# Patient Record
Sex: MALE | Race: WHITE | NOT HISPANIC OR LATINO | Employment: PART TIME | URBAN - METROPOLITAN AREA
[De-identification: names, ages, dates, MRNs, and addresses within clinical notes are randomized per-mention and may not be internally consistent; named-entity substitution may affect disease eponyms.]

---

## 2022-01-08 ENCOUNTER — HOSPITAL ENCOUNTER (EMERGENCY)
Facility: HOSPITAL | Age: 27
Discharge: HOME | End: 2022-01-09
Admitting: EMERGENCY MEDICINE

## 2022-01-08 ENCOUNTER — APPOINTMENT (OUTPATIENT)
Dept: GENERAL RADIOLOGY | Facility: HOSPITAL | Age: 27
End: 2022-01-08

## 2022-01-08 DIAGNOSIS — M54.50 CHRONIC LOW BACK PAIN, UNSPECIFIED BACK PAIN LATERALITY, UNSPECIFIED WHETHER SCIATICA PRESENT: Primary | ICD-10-CM

## 2022-01-08 DIAGNOSIS — G89.29 CHRONIC LOW BACK PAIN, UNSPECIFIED BACK PAIN LATERALITY, UNSPECIFIED WHETHER SCIATICA PRESENT: Primary | ICD-10-CM

## 2022-01-08 DIAGNOSIS — R06.02 SHORTNESS OF BREATH: ICD-10-CM

## 2022-01-08 LAB
ALBUMIN SERPL-MCNC: 4.6 G/DL (ref 3.5–5.2)
ALBUMIN/GLOB SERPL: 1.8 G/DL
ALP SERPL-CCNC: 46 U/L (ref 39–117)
ALT SERPL W P-5'-P-CCNC: 14 U/L (ref 1–41)
ANION GAP SERPL CALCULATED.3IONS-SCNC: 14 MMOL/L (ref 5–15)
AST SERPL-CCNC: 15 U/L (ref 1–40)
BASOPHILS # BLD AUTO: 0 10*3/MM3 (ref 0–0.2)
BASOPHILS NFR BLD AUTO: 0.2 % (ref 0–1.5)
BILIRUB SERPL-MCNC: 0.5 MG/DL (ref 0–1.2)
BUN SERPL-MCNC: 10 MG/DL (ref 6–20)
BUN/CREAT SERPL: 13.3 (ref 7–25)
CALCIUM SPEC-SCNC: 9.5 MG/DL (ref 8.6–10.5)
CHLORIDE SERPL-SCNC: 103 MMOL/L (ref 98–107)
CO2 SERPL-SCNC: 21 MMOL/L (ref 22–29)
CREAT SERPL-MCNC: 0.75 MG/DL (ref 0.76–1.27)
D DIMER PPP FEU-MCNC: 0.28 MG/L (FEU) (ref 0–0.59)
DEPRECATED RDW RBC AUTO: 41.6 FL (ref 37–54)
EOSINOPHIL # BLD AUTO: 0 10*3/MM3 (ref 0–0.4)
EOSINOPHIL NFR BLD AUTO: 0 % (ref 0.3–6.2)
ERYTHROCYTE [DISTWIDTH] IN BLOOD BY AUTOMATED COUNT: 13.5 % (ref 12.3–15.4)
GFR SERPL CREATININE-BSD FRML MDRD: 126 ML/MIN/1.73
GLOBULIN UR ELPH-MCNC: 2.6 GM/DL
GLUCOSE SERPL-MCNC: 149 MG/DL (ref 65–99)
HCT VFR BLD AUTO: 42.2 % (ref 37.5–51)
HGB BLD-MCNC: 14.6 G/DL (ref 13–17.7)
LYMPHOCYTES # BLD AUTO: 0.6 10*3/MM3 (ref 0.7–3.1)
LYMPHOCYTES NFR BLD AUTO: 6.8 % (ref 19.6–45.3)
MCH RBC QN AUTO: 30.6 PG (ref 26.6–33)
MCHC RBC AUTO-ENTMCNC: 34.6 G/DL (ref 31.5–35.7)
MCV RBC AUTO: 88.5 FL (ref 79–97)
MONOCYTES # BLD AUTO: 0.3 10*3/MM3 (ref 0.1–0.9)
MONOCYTES NFR BLD AUTO: 3.9 % (ref 5–12)
NEUTROPHILS NFR BLD AUTO: 7.4 10*3/MM3 (ref 1.7–7)
NEUTROPHILS NFR BLD AUTO: 89.1 % (ref 42.7–76)
NRBC BLD AUTO-RTO: 0 /100 WBC (ref 0–0.2)
NT-PROBNP SERPL-MCNC: 18.7 PG/ML (ref 0–450)
PLATELET # BLD AUTO: 346 10*3/MM3 (ref 140–450)
PMV BLD AUTO: 6.6 FL (ref 6–12)
POTASSIUM SERPL-SCNC: 4.2 MMOL/L (ref 3.5–5.2)
PROT SERPL-MCNC: 7.2 G/DL (ref 6–8.5)
RBC # BLD AUTO: 4.77 10*6/MM3 (ref 4.14–5.8)
SARS-COV-2 RNA PNL SPEC NAA+PROBE: NOT DETECTED
SODIUM SERPL-SCNC: 138 MMOL/L (ref 136–145)
TROPONIN T SERPL-MCNC: <0.01 NG/ML (ref 0–0.03)
WBC NRBC COR # BLD: 8.3 10*3/MM3 (ref 3.4–10.8)

## 2022-01-08 PROCEDURE — 87635 SARS-COV-2 COVID-19 AMP PRB: CPT | Performed by: NURSE PRACTITIONER

## 2022-01-08 PROCEDURE — 99283 EMERGENCY DEPT VISIT LOW MDM: CPT

## 2022-01-08 PROCEDURE — 83880 ASSAY OF NATRIURETIC PEPTIDE: CPT | Performed by: NURSE PRACTITIONER

## 2022-01-08 PROCEDURE — 80053 COMPREHEN METABOLIC PANEL: CPT | Performed by: NURSE PRACTITIONER

## 2022-01-08 PROCEDURE — 85025 COMPLETE CBC W/AUTO DIFF WBC: CPT | Performed by: NURSE PRACTITIONER

## 2022-01-08 PROCEDURE — C9803 HOPD COVID-19 SPEC COLLECT: HCPCS

## 2022-01-08 PROCEDURE — 84484 ASSAY OF TROPONIN QUANT: CPT | Performed by: NURSE PRACTITIONER

## 2022-01-08 PROCEDURE — 85379 FIBRIN DEGRADATION QUANT: CPT | Performed by: NURSE PRACTITIONER

## 2022-01-08 PROCEDURE — 93005 ELECTROCARDIOGRAM TRACING: CPT | Performed by: NURSE PRACTITIONER

## 2022-01-08 PROCEDURE — 71111 X-RAY EXAM RIBS/CHEST4/> VWS: CPT

## 2022-01-08 RX ORDER — SODIUM CHLORIDE 0.9 % (FLUSH) 0.9 %
10 SYRINGE (ML) INJECTION AS NEEDED
Status: DISCONTINUED | OUTPATIENT
Start: 2022-01-08 | End: 2022-01-09 | Stop reason: HOSPADM

## 2022-01-09 VITALS
RESPIRATION RATE: 20 BRPM | DIASTOLIC BLOOD PRESSURE: 79 MMHG | TEMPERATURE: 98.2 F | HEIGHT: 67 IN | WEIGHT: 120.4 LBS | HEART RATE: 89 BPM | SYSTOLIC BLOOD PRESSURE: 118 MMHG | OXYGEN SATURATION: 98 % | BODY MASS INDEX: 18.9 KG/M2

## 2022-01-09 LAB — QT INTERVAL: 309 MS

## 2022-01-09 NOTE — ED PROVIDER NOTES
Subjective    Chief Complaint   Patient presents with   • Illness     Provider, No Known  No LMP for male patient.  Allergies   Allergen Reactions   • Cefaclor Hives and Rash     History Provided By: Patient    Patient is a 26-year-old male presents emergency department with several complaints today.  He presents with low back pain, chest pain shortness of breath.  Patient has a longstanding history of low back pain, he has been seen by neurosurgery in the past, his primary care provider, as well as physical therapy.  He also reports that he has a diagnosis of asthma, that is exacerbated by his anxiety.  He reports he was working this week, and had worsening pain in his back.  He denies any direct trauma or falls.  No bowel or bladder symptoms.  He does report numbness and tingling in his bilateral lower extremities.  He also reports shortness of breath, feeling as though he cannot take a deep breath.  He denies any abnormal leg pain or swelling.  No nausea, vomiting or diarrhea.  No dizziness, lightheadedness, diaphoresis or syncope.  No recent travel.  No fever or chills.  No history of IV drug abuse.    Of note patient was seen at [facility] for similar presentation this morning.  Please see record review.          Review of Systems   Constitutional: Negative for chills, diaphoresis, fatigue and fever.   Eyes: Negative for photophobia and visual disturbance.   Respiratory: Positive for shortness of breath. Negative for cough and chest tightness.    Cardiovascular: Positive for chest pain. Negative for palpitations and leg swelling.   Gastrointestinal: Negative for abdominal pain, constipation, nausea and vomiting.   Genitourinary: Negative for dysuria.   Musculoskeletal: Positive for back pain. Negative for neck pain.   Skin: Negative for rash.   Neurological: Positive for numbness. Negative for dizziness, tremors, seizures, syncope, facial asymmetry, speech difficulty, weakness, light-headedness and  headaches.       No past medical history on file.    Allergies   Allergen Reactions   • Cefaclor Hives and Rash       No past surgical history on file.    No family history on file.    Social History     Socioeconomic History   • Marital status: Single           Objective   Physical Exam  Vitals and nursing note reviewed.   Constitutional:       General: He is not in acute distress.     Appearance: Normal appearance. He is not ill-appearing, toxic-appearing or diaphoretic.   HENT:      Head: Normocephalic.      Nose: Nose normal.      Mouth/Throat:      Mouth: Mucous membranes are moist.      Pharynx: Oropharynx is clear.   Eyes:      Extraocular Movements: Extraocular movements intact.      Conjunctiva/sclera: Conjunctivae normal.      Pupils: Pupils are equal, round, and reactive to light.   Cardiovascular:      Rate and Rhythm: Normal rate and regular rhythm.      Heart sounds: Normal heart sounds. No murmur heard.  No friction rub. No gallop.    Pulmonary:      Effort: Pulmonary effort is normal.      Breath sounds: Normal breath sounds.   Chest:      Chest wall: No deformity, tenderness or crepitus.      Comments: No abnormalities appreciated upon palpation  Abdominal:      General: Bowel sounds are normal.      Palpations: Abdomen is soft.      Tenderness: There is no abdominal tenderness. There is no guarding or rebound.   Musculoskeletal:         General: Normal range of motion.      Cervical back: Normal range of motion and neck supple.      Right lower leg: No edema.      Left lower leg: No edema.      Comments: No vertebral point tenderness noted to the C-spine T-spine or L-spine.  No palpable step-offs.  No soft tissue tenderness is noted.  No skin abnormalities are appreciated.  No saddle anesthesia   Skin:     General: Skin is warm and dry.      Capillary Refill: Capillary refill takes less than 2 seconds.      Findings: No erythema or rash.   Neurological:      General: No focal deficit present.       "Mental Status: He is alert and oriented to person, place, and time.      Comments: Bilateral lower extremity strength out of 5 and equal.  Bilateral upper extremity strength 5 out of 5 and equal.   Psychiatric:         Attention and Perception: Attention and perception normal.         Mood and Affect: Mood is anxious.         Speech: Speech is rapid and pressured.         Behavior: Behavior normal.         Thought Content: Thought content does not include suicidal ideation. Thought content does not include suicidal plan.       Procedures           ED Course  ED Course as of 01/09/22 0214   Sat Jan 08, 2022   1948 Records requested from [facility]. [LB]   2227 Patient is resting comfortably without acute distress. [LB]   Sun Jan 09, 2022   0039 EKG sinus tachycardia rate 113.  ST elevation early repolarization.  No comparison available.  Interpreted per ED physician.  Independently by me [LB]      ED Course User Index  [LB] Abeba Boyer, APRN                /79   Pulse 89   Temp 98.2 °F (36.8 °C) (Oral)   Resp 20   Ht 170.2 cm (67\")   Wt 54.6 kg (120 lb 6.4 oz)   SpO2 98%   BMI 18.86 kg/m²   Labs Reviewed   COMPREHENSIVE METABOLIC PANEL - Abnormal; Notable for the following components:       Result Value    Glucose 149 (*)     Creatinine 0.75 (*)     CO2 21.0 (*)     All other components within normal limits    Narrative:     GFR Normal >60  Chronic Kidney Disease <60  Kidney Failure <15     CBC WITH AUTO DIFFERENTIAL - Abnormal; Notable for the following components:    Neutrophil % 89.1 (*)     Lymphocyte % 6.8 (*)     Monocyte % 3.9 (*)     Eosinophil % 0.0 (*)     Neutrophils, Absolute 7.40 (*)     Lymphocytes, Absolute 0.60 (*)     All other components within normal limits   COVID-19,CEPHEID/WILMA,COR/FEDERICO/PAD/DAVID IN-HOUSE,NP SWAB IN TRANSPORT MEDIA 3-4 HR TAT, RT-PCR - Normal    Narrative:     Fact sheet for providers: https://www.fda.gov/media/106481/download     Fact sheet for patients: " https://www.fda.gov/media/104633/download  Fact sheet for providers: https://www.fda.gov/media/811822/download    Fact sheet for patients: https://www.fda.gov/media/264103/download    Test performed by PCR.   BNP (IN-HOUSE) - Normal    Narrative:     Among patients with dyspnea, NT-proBNP is highly sensitive for the detection of acute congestive heart failure. In addition NT-proBNP of <300 pg/ml effectively rules out acute congestive heart failure with 99% negative predictive value.    Results may be falsely decreased if patient taking Biotin.     D-DIMER, QUANTITATIVE - Normal    Narrative:     Reference Range  --------------------------------------------------------------------     < 0.50   Negative Predictive Value  0.50-0.59   Indeterminate    >= 0.60   Probable VTE             A very low percentage of patients with DVT may yield D-Dimer results   below the cut-off of 0.50 mg/L FEU.  This is known to be more   prevalent in patients with distal DVT.             Results of this test should always be interpreted in conjunction with   the patient's medical history, clinical presentation and other   findings.  Clinical diagnosis should not be based on the result of   INNOVANCE D-Dimer alone.   TROPONIN (IN-HOUSE) - Normal    Narrative:     Troponin T Reference Range:  <= 0.03 ng/mL-   Negative for AMI  >0.03 ng/mL-     Abnormal for myocardial necrosis.  Clinicians would have to utilize clinical acumen, EKG, Troponin and serial changes to determine if it is an Acute Myocardial Infarction or myocardial injury due to an underlying chronic condition.       Results may be falsely decreased if patient taking Biotin.     CBC AND DIFFERENTIAL    Narrative:     The following orders were created for panel order CBC & Differential.  Procedure                               Abnormality         Status                     ---------                               -----------         ------                     CBC Auto  Differential[[id number]]        Abnormal            Final result                 Please view results for these tests on the individual orders.     Medications   sodium chloride 0.9 % flush 10 mL (has no administration in time range)     XR Ribs Bilateral 4+ View With PA Chest    Result Date: 1/8/2022  1.  No  rib fracture or other acute abnormality in the chest visualized. Electronically signed by:  Bernardo Willett M.D.  1/8/2022 9:02 PM                                     MDM  Number of Diagnoses or Management Options  Chronic low back pain, unspecified back pain laterality, unspecified whether sciatica present  Shortness of breath  Diagnosis management comments: Patient is a 26-year-old male presents to the emergency department for evaluation of multiple complaints today, reporting his primary complaint being shortness of breath and back pain.  Patient reports that he was seen at [facility] today for similar symptoms.  Please see record review in HPI.  He had work-up there which included MRI.  Patient has no evidence for cauda equina or acute neurosurgical emergency on exam today.  His work-up here in the ED is reassuring, the patient is extremely anxious on exam.  Exam is otherwise benign.  Patient does have a an established care with [facility] [place], advised him to continue follow-up with him regarding his back pain.  He will begin information for primary care provider follow-up.    I spoke with the patient at the bedside regarding their plan of care, discharge instruction, home care, prescriptions, and importance follow-up.  We discussed test results at the bedside, including incidental abnormal labs, radiological findings, understands need for follow-up with primary care or specialist if indicated.      Patient was made aware of indications to return to the emergency department.  Patient agrees with the current plan of care for discharge, verbalized understanding of all instructions    Pt is aware  that discharge does not mean that nothing is wrong but it indicates no emergency is present and they must continue care with follow-up as given below or physician of their choice       Amount and/or Complexity of Data Reviewed  Clinical lab tests: reviewed  Tests in the radiology section of CPT®: reviewed  Tests in the medicine section of CPT®: reviewed  Decide to obtain previous medical records or to obtain history from someone other than the patient: yes  Review and summarize past medical records: yes (Reviewed spine surgery note from 11/22/2021.  Patient was seen for evaluation of neck and arm pain, with associated numbness, tingling, subjective weakness, which at this time is been documented has been present since 2014.  Per this note cervical MRI was negative.  Bar MRI revealed L5-S1 spondylolisthesis, bilateral pars defect, contributing moderate to severe bilateral foraminal stenosis.    Reviewed records from [facility] from visit today 1/8/2022 at 07 38.  Per the note patient had been seen in the ER the past 2 days for asthma, but was not currently evaluated for back pain at that time, he was placed on prednisone for his asthma, as well as given Solu-Medrol.  White blood cell count 9.6.  Hemoglobin 14.9, hematocrit 45.  Sodium 140, potassium 3.1.  Creatinine 0.7.  GFR greater than 60.  Drug screen positive for THC.  MRI lumbar spine without contrast was completed.  Impression severely motion limited exam.  Bilateral pars defects at L5-S1 with anterior listhesis of L5 on S1 at approximate 7 mm.  Mild degenerative change at L5-S1 with mild circumferential disc bulge.  This appears similar to prior CT of 2015.  Bilateral neuroforaminal narrowing at L5-S1 due to anterolisthesis and lateral disc components.)    Patient Progress  Patient progress: stable      Final diagnoses:   Chronic low back pain, unspecified back pain laterality, unspecified whether sciatica present   Shortness of breath       ED  Disposition  ED Disposition     ED Disposition Condition Comment    Discharge Stable           PATIENT CONNECTION - [facility] [id number]  [phone]  Schedule an appointment as soon as possible for a visit   Call for assistance with follow up with Primary care provider-call tomorrow.         Medication List      No changes were made to your prescriptions during this visit.          Abeba Boyer, APRN  01/09/22 0214

## 2022-01-09 NOTE — DISCHARGE INSTRUCTIONS
Please continue to follow-up with the spine center which you have seen in the past  Please utilize patient connection as above to schedule follow-up appointment with PCP  Return to the ED for new or worsening symptoms

## 2022-11-27 ENCOUNTER — HOSPITAL ENCOUNTER (OUTPATIENT)
Facility: HOSPITAL | Age: 27
Discharge: HOME | End: 2022-11-27
Admitting: EMERGENCY MEDICINE

## 2022-11-27 VITALS
RESPIRATION RATE: 16 BRPM | WEIGHT: 135 LBS | OXYGEN SATURATION: 99 % | HEIGHT: 67 IN | SYSTOLIC BLOOD PRESSURE: 127 MMHG | BODY MASS INDEX: 21.19 KG/M2 | DIASTOLIC BLOOD PRESSURE: 55 MMHG | TEMPERATURE: 99.7 F | HEART RATE: 88 BPM

## 2022-11-27 DIAGNOSIS — J10.1 INFLUENZA A: Primary | ICD-10-CM

## 2022-11-27 LAB
FLUAV SUBTYP SPEC NAA+PROBE: DETECTED
FLUBV RNA ISLT QL NAA+PROBE: NOT DETECTED
SARS-COV-2 RNA RESP QL NAA+PROBE: NOT DETECTED

## 2022-11-27 PROCEDURE — C9803 HOPD COVID-19 SPEC COLLECT: HCPCS

## 2022-11-27 PROCEDURE — 87636 SARSCOV2 & INF A&B AMP PRB: CPT

## 2022-11-27 PROCEDURE — G0463 HOSPITAL OUTPT CLINIC VISIT: HCPCS | Performed by: NURSE PRACTITIONER

## 2022-11-27 PROCEDURE — 99204 OFFICE O/P NEW MOD 45 MIN: CPT | Performed by: NURSE PRACTITIONER

## 2022-11-27 RX ORDER — ONDANSETRON 4 MG/1
4 TABLET, ORALLY DISINTEGRATING ORAL EVERY 6 HOURS PRN
Qty: 20 TABLET | Refills: 0 | Status: SHIPPED | OUTPATIENT
Start: 2022-11-27 | End: 2022-12-02

## 2023-07-12 ENCOUNTER — OFFICE (OUTPATIENT)
Dept: URBAN - METROPOLITAN AREA CLINIC 64 | Facility: CLINIC | Age: 28
End: 2023-07-12
Payer: COMMERCIAL

## 2023-07-12 VITALS
HEIGHT: 67 IN | WEIGHT: 116 LBS | HEART RATE: 107 BPM | SYSTOLIC BLOOD PRESSURE: 111 MMHG | DIASTOLIC BLOOD PRESSURE: 93 MMHG

## 2023-07-12 DIAGNOSIS — R63.4 ABNORMAL WEIGHT LOSS: ICD-10-CM

## 2023-07-12 DIAGNOSIS — R13.10 DYSPHAGIA, UNSPECIFIED: ICD-10-CM

## 2023-07-12 DIAGNOSIS — K59.00 CONSTIPATION, UNSPECIFIED: ICD-10-CM

## 2023-07-12 DIAGNOSIS — K21.00 GASTRO-ESOPHAGEAL REFLUX DISEASE WITH ESOPHAGITIS, WITHOUT B: ICD-10-CM

## 2023-07-12 PROCEDURE — 99203 OFFICE O/P NEW LOW 30 MIN: CPT

## 2023-07-18 ENCOUNTER — OFFICE (OUTPATIENT)
Dept: URBAN - METROPOLITAN AREA PATHOLOGY 4 | Facility: PATHOLOGY | Age: 28
End: 2023-07-18
Payer: COMMERCIAL

## 2023-07-18 ENCOUNTER — ON CAMPUS - OUTPATIENT (OUTPATIENT)
Dept: URBAN - METROPOLITAN AREA HOSPITAL 2 | Facility: HOSPITAL | Age: 28
End: 2023-07-18
Payer: COMMERCIAL

## 2023-07-18 VITALS
TEMPERATURE: 97.8 F | HEART RATE: 57 BPM | OXYGEN SATURATION: 99 % | DIASTOLIC BLOOD PRESSURE: 72 MMHG | SYSTOLIC BLOOD PRESSURE: 97 MMHG | SYSTOLIC BLOOD PRESSURE: 116 MMHG | SYSTOLIC BLOOD PRESSURE: 114 MMHG | SYSTOLIC BLOOD PRESSURE: 95 MMHG | WEIGHT: 119 LBS | DIASTOLIC BLOOD PRESSURE: 71 MMHG | HEART RATE: 62 BPM | DIASTOLIC BLOOD PRESSURE: 59 MMHG | SYSTOLIC BLOOD PRESSURE: 106 MMHG | DIASTOLIC BLOOD PRESSURE: 79 MMHG | RESPIRATION RATE: 16 BRPM | OXYGEN SATURATION: 100 % | HEART RATE: 88 BPM | DIASTOLIC BLOOD PRESSURE: 62 MMHG | HEART RATE: 85 BPM | RESPIRATION RATE: 14 BRPM | HEART RATE: 86 BPM | HEIGHT: 67 IN

## 2023-07-18 DIAGNOSIS — R63.4 ABNORMAL WEIGHT LOSS: ICD-10-CM

## 2023-07-18 DIAGNOSIS — K20.80 OTHER ESOPHAGITIS WITHOUT BLEEDING: ICD-10-CM

## 2023-07-18 DIAGNOSIS — R13.10 DYSPHAGIA, UNSPECIFIED: ICD-10-CM

## 2023-07-18 LAB
GI HISTOLOGY: A. UNSPECIFIED: (no result)
GI HISTOLOGY: PDF REPORT: (no result)

## 2023-07-18 PROCEDURE — 43239 EGD BIOPSY SINGLE/MULTIPLE: CPT | Performed by: INTERNAL MEDICINE

## 2023-07-18 PROCEDURE — 88305 TISSUE EXAM BY PATHOLOGIST: CPT | Performed by: INTERNAL MEDICINE

## 2023-07-18 RX ADMIN — ONDANSETRON HYDROCHLORIDE 4 MG: 2 INJECTION INTRAMUSCULAR; INTRAVENOUS at 12:35

## 2023-07-18 NOTE — SERVICEHPINOTES
INDIGO JACKSON  is a  27  male   who presents today for a  EGD   for   the indications listed below. The updated Patient Profile was reviewed prior to the procedure, in conjunction with the Physical Exam, including medical conditions, surgical procedures, medications, allergies, family history and social history. See Physical Exam time stamp below for date and time of HPI completion.Pre-operatively, I reviewed the indication(s) for the procedure, the risks of the procedure [including but not limited to: unexpected bleeding possibly requiring hospitalization and/or unplanned repeat procedures, perforation possibly requiring surgical treatment, missed lesions and complications of sedation/MAC (also explained by anesthesia staff)]. I have evaluated the patient for risks associated with the planned anesthesia and the procedure to be performed and find the patient an acceptable candidate for IV sedation.Multiple opportunities were provided for any questions or concerns, and all questions were answered satisfactorily before any anesthesia was administered. We will proceed with the planned procedure.br

## 2023-08-11 ENCOUNTER — TRANSCRIBE ORDERS (OUTPATIENT)
Dept: SPEECH THERAPY | Facility: HOSPITAL | Age: 28
End: 2023-08-11
Payer: MEDICAID

## 2023-08-11 DIAGNOSIS — J38.3 VOCAL CORD DYSFUNCTION: Primary | ICD-10-CM

## 2023-09-05 ENCOUNTER — HOSPITAL ENCOUNTER (OUTPATIENT)
Dept: SPEECH THERAPY | Facility: HOSPITAL | Age: 28
Discharge: HOME | End: 2023-09-05
Admitting: ALLERGY & IMMUNOLOGY
Payer: MEDICAID

## 2023-09-05 PROCEDURE — 92524 BEHAVRAL QUALIT ANALYS VOICE: CPT

## 2023-09-06 NOTE — THERAPY EVALUATION
Outpatient Speech Language Pathology   Adult/Peds Voice Initial Evaluation  [facility]     Patient Name: Baljinder Yates  : 1995  MRN: [id number]  Today's Date: 2023         Visit Date: 2023       Visit Dx:  Vocal cord dysfunction  Dysphonia         PATIENT MEDICAL HISTORY:     Baljinder Yates is a 27 y.o. male who presents to [facility] this date for a Voice Evaluation by Herve Chanel MD.  Pt reports that for the last 2 to 3 years he has struggled with certain respiratory attacks in which he begins feeling like his throat is closing and intermittent pain when talking.   Further symptoms reported by patient are as follows; Hoarseness, weak volume, high volume, fatigue, breathiness, tickling or choking sensation while speaking, pain in throat while speaking, swallowing difficulties (states that sometimes he requires effortful swallows or breaks during meals due to fatigue), and breathing difficulties. Pt reports having respiratory attacks daily, and the feeling of not being able to breathe sometimes lasts around 3 hours.  Respiratory issues are worse in the mornings. Pt says he gets nerve pain in his lower back when respiratory attacks occur.     Patients goal for therapy to relieve stress and tension in his throat.    The patient was seen for voice evaluation and videostroboscopy at the [facility] on 2022 with findings supporting a diagnosis of vocal cord dysfunction. This included frequent periods of  paradoxical vocal fold movement/partially adducted position during inspiration. There was also a reported instance of complete glottic closure and stridor during inspiration.     Behavioral observations:   The patient demonstrated pervasive/repetitive movements of his BUE throughout exam. This included but not limited to a repetitive pattern of touching/moving his jaw prior to all structured Visipitch tasks in which the patient was cued to produce certain vocalizations.  These instances would last upwards up 30 seconds prior to pt being able to complete a task. The patient stated this was due to his need to feel that his jaw be adjusted forward/down/moved in place. The patient reports he is seeking neurology work up in [facility], and has an upcoming appointment with MRI. He reports previous MRI's have not been successful due to anxiety and motion, deteriorating the study. He reports he has been informally dx with tourette's in the past, as related to his motor movements, but that he is seeking a second opinion as he does not feel this is accurate.     The patient was also observed to have audible excessive/prolonged belching instances following all drinks consumed. Throat clearing also noted throughout exam, both following drinks of water and not associated with swallow.     VOCAL HYGIENE:  The patient reports using voice often in his occupation of  and hobby of singing. Daily liquid consumption is comprised of the followin oz of water which is refilled 3 or 4 times daily and occasional coffee, soda, and boost shakes. The patient endorses some of the below listed which may contribute to possible vocally abusive behaviors/instances:     Throat clearing, increased stress level    The patient reports taking the following medications:     Omeprazole, Tamsluosin, Montelukast, Nervive, Flonase, Amitriptaline, and Flexeril    The following medications and/or drug class were noted to have an impact on voice when cross referenced with the [employer] for [employer] and [employer] medication database:    Amitriptaline, Tamsluosin, and Flexeril     Side effects of the above include but not limited to: dry mouth, drying effect on mucous membranes that may cause hoarseness, sore throat, voice changes or laryngitis. Rarely, use of Flomax may cause pharyngitis (sore throat) or increased cough. Excessive coughing may lead to hoarseness and possible vocal tissue  "damage.     VOICE HANDICAP INDEX AND REFLUX SYMPTOM INDEX:  REFLUX SYMPTOM INDEX:     Pt completed the Reflux Symptom Index (RSI). RSI is self rated on a scale of 0-5 (0= no problem and 5= severe problem)    Results as follows:  Hoarseness of a problem with your voice: 4  Clearing your throat: 5  Excess throat mucus or postnasal drip 5  Difficulty swallowing food, liquids, or pills 5  Coughing after you are or after lying down 1  Breathing difficulties or choking episodes 5  Troublesome or annoying cough 5  Sensations of something sticking in your throat 3  Heartburn, chest pain, indigestion, or stomach acid coming up 5  Total: 38    Normative data suggests that an RSI total of greater than or equal to 13 is clinically significant and may indicate uncontrolled reflux disease. (Normative date suggests that a RSI of greater than or equal to 13 is clinically significant. Therefore a RSI > 13 may be indicative of significant reflux disease.)    The Voice Handicap Index is a 30-item questionnaire introduced by Tristen et al. as an instrument to quantify the psychosocial consequences of a voice disorder, either in terms of voice outcome or voice-related quality of life.    The Voice Handicap Index was completed by the patient with the following subsection scores:   Functional Impairment - 22   Physical - 20  Emotional - 25    This would equate to a composite score of 67 which would indicated a severe impact/vocal impairment.    Severity Common Association:  0-30 Mild Minimal amount of handicap   31-60 Moderate Often seen in patients with vocal nodules, polyps, or cysts    Severe Often seen in patients with vocal fold paralysis or severe vocal fold scarring.     Vocal Cord Dysfunction Differential Diagnosis Questionnaire     Do you have more trouble breathing \"in\" than you do \"out\"? Yes  Does your throat feel tight? Yes  Does your upper chest feel tight? Yes   Do you get hoarse or lose your voice? Yes  Do you make a " "breathing in noise (stridor)? \"All the time\"  Do you cough? Not during attack  Do you have a sensation of choking, suffocation or strangulation? Feeling of throat wanting to close  Do symptoms ever occur during sleep? No  Do inhaled bronchodilators prevent or abort attacks? Yes  Do your symptoms come on quickly? Yes / Do your symptoms go away quickly? No   Do you experience numbness and/or tingling in your hands, feet, or around your mouth? Hands, neck and diaphragm  Do you get dizzy, light-headed or have headaches? No     ACOUSTIC MEASURES:  Additional acoustic and auditory parameters of voice were measured using the Proxio IV. Fundamental Frequency for various speaking tasks stayed around 99 Htz (norm for an adult male/female is 100-120). Volume was adequate for this one on one environment measured at between 60 and 65 dB. Maximum phonation time was  reduced on all trials at 5.8 and 8.5 seconds with sustained vowel. When asked to sustain an 'm' Pt was able to sustain phonation for 8.9 seconds. S/Z ratio was measured at 0.56. Results of this were reduced in reliability due to pt having difficulty with clearly articulated/discriminate /z/ production.  Research shows that an s:z ratio in excessive of 1.4 is correlated with pathological laryngeal function in relation to dysphonia and voice disorders.   The patient had minimal to absent visible movement (thoracic/clavicular/diaphragmatic or otherwise) when cued for deep inspiration prior to voicing tasks. The patient feels he is frequently unable to get an adequate breath.         IMPRESSIONS:   The patient presents with multiple complaints, some of which are consistent with continued vocal fold dysfunction as found in 2022, in addition to some muscle tension dysphonia. Muscle tension dysphonia likely secondary to uncontrolled GERD, TMJ/tension, and reduced breath support related to VCD among other factors. The patient does report functional limitations in his job " "when resp attacks occur, and would benefit from skilled ST intervention for 1-3 sessions 1x per week with goals below.     Therapy prognosis with typical VCD goals may be limited due to patient reports of extreme anxiety. During exam, he reported perseverative focus/rumination on relaxing his throat/voice which in turn increases his anxiety regarding respiratory attacks.     LTG: Patient will be able to use voice in therapy, vocational, and avocational activities without functional limitations due to hoarseness supported by audible/perceptual measurements in therapy and pt report.     STGs:  1.Pt will demonstrate an understanding of the structures and functions of the phonatory/respiratory tract (respiration, phonation, resonance and articulation)     2.The patient will express understanding of condition (vocal cord dysfunction) in regards to triggers as well as techniques designed with the goal of avoiding or aborting respiratory attacks    3.The patient will participate in teaching and practice of relaxed throat breathing with min-max cues as needed including but not limited to /s/ and /sh/ exhalation for diagnostic stimulability    4.The patient will demonstrate understanding of diaphragmatic breath support during therapy with goal of improving MPT to 10 seconds    5. The patient will identify \"bumpy\" vs \"smooth\" voicing (glottal trejo/pitch breaks vs more balanced optimal voicing) with biofeedback on the visipitch during therapy on 80% of opportunities.      Teaching, counseling and written information was provided with extensive education regarding care/use of voice and potential therapy plan. A home care program was initiated via [employer] including literature and video instruction as follows:    Exercises  - Supine Diaphragmatic Breathing   - Standing Diaphragmatic Breathing   - Seated Diaphragmatic Breathing   Patient Education  - Vocal Hygiene: Do's and Don'ts for Maximizing Your Voice  - How the Voice " Works  - Vocal Hygiene: Hydration and Managing Mucus  - Vocal Hygiene: Throat Clearing & Lifestyle .        He is scheduled to return for therapy on 9/12.     Thank you for referring this pleasant patient & please feel free to contact our office with any questions.      Ese Rosado  MS CCC  Speech Language Pathologist  O: [phone]  E: [email or web address]                    Bhumi Meyers, Speech Therapy Student  9/6/2023